# Patient Record
Sex: MALE | Race: WHITE | NOT HISPANIC OR LATINO | URBAN - METROPOLITAN AREA
[De-identification: names, ages, dates, MRNs, and addresses within clinical notes are randomized per-mention and may not be internally consistent; named-entity substitution may affect disease eponyms.]

---

## 2020-05-15 ENCOUNTER — EMERGENCY (EMERGENCY)
Facility: HOSPITAL | Age: 25
LOS: 1 days | Discharge: HOME | End: 2020-05-15
Attending: EMERGENCY MEDICINE
Payer: SELF-PAY

## 2020-05-15 VITALS
DIASTOLIC BLOOD PRESSURE: 71 MMHG | HEART RATE: 91 BPM | OXYGEN SATURATION: 98 % | SYSTOLIC BLOOD PRESSURE: 142 MMHG | TEMPERATURE: 99 F | RESPIRATION RATE: 18 BRPM

## 2020-05-15 VITALS
WEIGHT: 240.08 LBS | HEART RATE: 92 BPM | RESPIRATION RATE: 18 BRPM | SYSTOLIC BLOOD PRESSURE: 148 MMHG | HEIGHT: 71 IN | TEMPERATURE: 99 F | DIASTOLIC BLOOD PRESSURE: 92 MMHG | OXYGEN SATURATION: 98 %

## 2020-05-15 PROCEDURE — 99283 EMERGENCY DEPT VISIT LOW MDM: CPT

## 2020-05-15 PROCEDURE — 99282 EMERGENCY DEPT VISIT SF MDM: CPT

## 2020-05-15 NOTE — ED PROVIDER NOTE - PATIENT PORTAL LINK FT
You can access the FollowMyHealth Patient Portal offered by [facility] by registering at the following website: [email or web address]. By joining [employer]’s FollowMyHealth portal, you will also be able to view your health information using other applications (apps) compatible with our system.

## 2020-05-15 NOTE — ED PROVIDER NOTE - NSFOLLOWUPINSTRUCTIONS_ED_ALL_ED_FT
Hydrate.  Ibuprofen or Tylenol as needed for pain; please respect warning signs on the label.  Lidoderm patch as needed for pain.  Follow up with your primary Dr. for reevaluation, call today for appointment.  Return for any concerns or worsening symptoms.

## 2020-05-15 NOTE — ED PROVIDER NOTE - OBJECTIVE STATEMENT
23yo male pt, no PMHx, ambulatory c/o neck soreness s/p injury 4days ago, Pt stated a heavy commercial garbage pail on his neck 4days ago and he noticed some neck soreness 2days later after injury. He also concerns he had experience of insomnia after the injury. Denies LOC. Denies radiating pain, sensory changes or weakness to extremities. Denies CP/SOB/ABD pain or N/V/D. Denies fever, cough, congestion or sore throat.

## 2020-05-15 NOTE — ED PROVIDER NOTE - PHYSICAL EXAMINATION
NAD, VSS, Afebrile, Normal throat. Neck supple with full ROMs. No spinal tender. Lungs clear. ABD soft, non tender. No focal neuro deficit.

## 2020-05-15 NOTE — ED ADULT NURSE NOTE - OBJECTIVE STATEMENT
24 y M presents to the ED after having a large trash can fall on his neck on monday. Pt reports that at the time he had no pain or discomfort but two days later began having some discomfort. On assessment, AOx4. PERRL 3 mm. DURÁN. no facial droop, slurred speech, and arm drift. strength equal in all extremities. no vision changes.  Denies dizziness, headaches, lightheadedness, numbness and tingling. Breathing spontaneously and unlabored on Room air. Denies cough, SOB and CP. S1 and S2 heard. No Peripheral edema. Cap refill 2s. No JVD. Peripheral pulses strong and equal bilaterally. Denies CP, SOB and palpitations. Abdomen soft, nontender, nondistended, negative CVA tenderness, positive bowel sounds in all four quadrants. Pt is continent. Denies n/v/d, dysuria, melena and hematuria. Seen and evaluated by MD. Awaiting dispo.

## 2020-05-15 NOTE — ED PROVIDER NOTE - ATTENDING CONTRIBUTION TO CARE
Attending Statement (DOROTHEA Schreiber MD):    HPI: 23y/o M with no reported medical history c/o neck soreness after a garbage pail fell on his neck 4 days ago.  No pain initially, then noted some neck soreness 2 days after injury which has persisted, worse with movement of neck; indicates area over b/l trapezius muscles.  No numbness/weakness in extremities.  NO fever, no cough, no SOB, no chest pain, no HA.    Review of Systems:  -General: no fever or chills  -ENT: no difficulty swallowing  -Pulmonary: no cough, no shortness of breath  -Cardiac: no chest pain  -Gastrointestinal: no nausea, no vomiting  -Musculoskeletal: no back or neck pain  -Neurologic: No focal weakness or numbness    PSH/PMH as noted above    On Physical Exam:  General: well appearing, in NAD, speaking clearly in full sentences and without difficulty; cooperative with exam  HEENT: airway patent  Neck: no neck tenderness, no nuchal rigidity, FROM of neck.  Extremities: no peripheral edema, no gross deformities  Neuro: no gross neurologic deficits; sensation and FROM of upper extremities.  normal gait.    MDM: 23y/o M with no reported medical history c/o neck soreness after a garbage pail fell on his neck 4 days ago. Pt is well appearing, and has no c spine tenderness, is neurologically intact; likely neck strain; stable for dc with NSAID use, warm compresses; f/u with pcp.

## 2020-05-15 NOTE — ED ADULT NURSE NOTE - NSIMPLEMENTINTERV_GEN_ALL_ED
Implemented All Universal Safety Interventions:  [place] to call system. Call bell, personal items and telephone within reach. Instruct patient to call for assistance. Room bathroom lighting operational. Non-slip footwear when patient is off stretcher. Physically safe environment: no spills, clutter or unnecessary equipment. Stretcher in lowest position, wheels locked, appropriate side rails in place.

## 2022-03-22 NOTE — ED ADULT TRIAGE NOTE - MODE OF ARRIVAL
Walk in Cephalexin Counseling: I counseled the patient regarding use of cephalexin as an antibiotic for prophylactic and/or therapeutic purposes. Cephalexin (commonly prescribed under brand name Keflex) is a cephalosporin antibiotic which is active against numerous classes of bacteria, including most skin bacteria. Side effects may include nausea, diarrhea, gastrointestinal upset, rash, hives, yeast infections, and in rare cases, hepatitis, kidney disease, seizures, fever, confusion, neurologic symptoms, and others. Patients with severe allergies to penicillin medications are cautioned that there is about a 10% incidence of cross-reactivity with cephalosporins. When possible, patients with penicillin allergies should use alternatives to cephalosporins for antibiotic therapy.